# Patient Record
Sex: MALE | Race: WHITE | NOT HISPANIC OR LATINO | URBAN - NONMETROPOLITAN AREA
[De-identification: names, ages, dates, MRNs, and addresses within clinical notes are randomized per-mention and may not be internally consistent; named-entity substitution may affect disease eponyms.]

---

## 2024-04-11 ENCOUNTER — OFFICE (OUTPATIENT)
Dept: URBAN - NONMETROPOLITAN AREA CLINIC 1 | Facility: CLINIC | Age: 49
End: 2024-04-11
Payer: COMMERCIAL

## 2024-04-11 VITALS
SYSTOLIC BLOOD PRESSURE: 136 MMHG | DIASTOLIC BLOOD PRESSURE: 96 MMHG | HEIGHT: 71 IN | SYSTOLIC BLOOD PRESSURE: 149 MMHG | WEIGHT: 164 LBS | DIASTOLIC BLOOD PRESSURE: 93 MMHG | HEART RATE: 80 BPM

## 2024-04-11 DIAGNOSIS — K64.4 RESIDUAL HEMORRHOIDAL SKIN TAGS: ICD-10-CM

## 2024-04-11 DIAGNOSIS — R19.4 CHANGE IN BOWEL HABIT: ICD-10-CM

## 2024-04-11 DIAGNOSIS — Z79.1 LONG TERM (CURRENT) USE OF NON-STEROIDAL ANTI-INFLAMMATORIES: ICD-10-CM

## 2024-04-11 DIAGNOSIS — R19.5 OTHER FECAL ABNORMALITIES: ICD-10-CM

## 2024-04-11 DIAGNOSIS — K21.9 GASTRO-ESOPHAGEAL REFLUX DISEASE WITHOUT ESOPHAGITIS: ICD-10-CM

## 2024-04-11 PROCEDURE — 36415 COLL VENOUS BLD VENIPUNCTURE: CPT | Performed by: NURSE PRACTITIONER

## 2024-04-11 PROCEDURE — 99205 OFFICE O/P NEW HI 60 MIN: CPT | Performed by: NURSE PRACTITIONER

## 2024-04-11 RX ORDER — PANTOPRAZOLE SODIUM 40 MG/1
40 TABLET, DELAYED RELEASE ORAL
Qty: 30 | Refills: 3 | Status: ACTIVE
Start: 2024-04-11

## 2024-04-11 NOTE — SERVICEHPINOTES
Patient with a history of constipation presents to the clinic today for ER f/u of rectal bleeding. He reports severe constipation 3-4 days prior to going to ER and he noticed bright red rectal bleeding which led him to the ER 3/2024.  CT AP obtained that did not reveal gi hemorrage.  He reports complications with chronic constipation in which he takes stool softeners and Miralax as needed which seems to help, however, he does not take these daily.  He denies any abdominal pain, n/v/d, unintentional weight loss, melena, or fever.  He has noticed a change in BM approximately 2 years ago in which he would have a normal BM daily, but now is constipated.  He has external hemorrhoids-bleeding as well found in ER and was prescribed Anusol-HC suppositories-however, pt never got these filled.  He denies rectal pain or itching.  H&ampH in the ER stable, but positive Fecal occult stool test in ER.  He has never had a colonoscopy in the past.  He denies a family history of crc.  Smokes 1ppd for approximately 20 years.  He admits taking Aleve frequently for toothache and pains.  He has stopped NSAID use now d/t the rectal bleeding and he has prescribed HCD for the pain.  Denies anticoagulation therapy, supplemental oxygenation use, cardiac stents, or kidney disease. br
br Also, pt reports chronic GERD, and indigestion in which he takes otc Prilosec otc daily and he cannot go without the medication or his heartburn and reflux is unbearable.  He also admits to former alcohol abuse (liquor) in which he used to vomit blood approximately 8 years ago.  At current he states he does not drink liquor and only has 3-4 Smirnoff beverages per week.  Denies any current melena or hematemesis.  Denies dysphagia.  He is able to eat per usual without n/v. 
Labs ER 3/24- Positive fecal occult blood, Hgb 15.9, HCT 47.6, , ALT20, AST 22, tbili and lipase normal. br   
br CT AP w/o and w/ contrast 3/24/24
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

## 2024-04-11 NOTE — SERVICENOTES
Risks of procedures explained to patient he wishes to proceed 
Bowel prep prescribed instructions given to patient 
EGD for signs/symptoms as above to rule out upper GI bleed, PUD, H pylori,
Colonoscopy for positive fecal occult blood test, rectal bleeding, constipation and CRC screening for age
Trial samples of Linzess 145 mcg 1 tablet daily for constipation 
Continue MiraLax and stool softeners over-the-counter daily to help promote daily soft bowel movements and prevent constipation and straining with defecation
Stop over-the-counter Prilosec and start pantoprazole 40 p.o. once daily for GERD.
Smoking cessation discussed 
Alcohol cessation encouraged.
Avoid all NSAIDs.
Increase fiber and water intake daily.
ER report, scans, labs reviewed.

## 2024-05-29 ENCOUNTER — ON CAMPUS - OUTPATIENT (OUTPATIENT)
Dept: URBAN - NONMETROPOLITAN AREA HOSPITAL 34 | Facility: HOSPITAL | Age: 49
End: 2024-05-29

## 2024-05-29 DIAGNOSIS — K62.5 HEMORRHAGE OF ANUS AND RECTUM: ICD-10-CM

## 2024-05-29 DIAGNOSIS — R10.84 GENERALIZED ABDOMINAL PAIN: ICD-10-CM

## 2024-05-29 DIAGNOSIS — K64.2 THIRD DEGREE HEMORRHOIDS: ICD-10-CM

## 2024-05-29 PROCEDURE — 45398 COLONOSCOPY W/BAND LIGATION: CPT | Performed by: INTERNAL MEDICINE

## 2024-05-29 PROCEDURE — 43235 EGD DIAGNOSTIC BRUSH WASH: CPT | Mod: 51 | Performed by: INTERNAL MEDICINE
